# Patient Record
Sex: MALE | Race: WHITE
[De-identification: names, ages, dates, MRNs, and addresses within clinical notes are randomized per-mention and may not be internally consistent; named-entity substitution may affect disease eponyms.]

---

## 2018-06-14 ENCOUNTER — HOSPITAL ENCOUNTER (EMERGENCY)
Dept: HOSPITAL 80 - FED | Age: 23
Discharge: HOME | End: 2018-06-14
Payer: SELF-PAY

## 2018-06-14 VITALS — DIASTOLIC BLOOD PRESSURE: 63 MMHG | SYSTOLIC BLOOD PRESSURE: 122 MMHG
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
## 2018-06-14 DIAGNOSIS — S61.213A: Primary | ICD-10-CM
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
## 2018-06-14 DIAGNOSIS — F17.200: ICD-10-CM
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
## 2018-06-14 PROCEDURE — 0HQGXZZ REPAIR LEFT HAND SKIN, EXTERNAL APPROACH: ICD-10-PCS

## 2018-06-14 NOTE — EDPHY
General


Time Seen by Provider: 06/14/18 09:09


Narrative: 





CHIEF COMPLAINT: 


Laceration to finger





HISTORY OF PRESENT ILLNESS: 


Patient presents with complaints of laceration to left middle finger.  This 

happened approximately midnight, 8 hr prior to presentation.  He says he was 

cutting food with a knife when he accidentally "stabbed myself in the finger." 

He reports this happened at work.  He said that he did not think it was 

important the times we put a dressing on it went to bed.  When he woke this 

morning, he felt that it was bleeding with should been thus he presents.  

Minimal pain.  No numbness, tingling or weakness.  This is on the radial side 

of the finger.  No difficulty bending or straightening the finger.  No other 

associated complaints or modifying factors





TIME OF INJURY:


Midnight, 8 hr prior to presentation





TETANUS STATUS:


Less than 2 years ago





MEDICAL/SURGICAL/SOCIAL HISTORY:


Uncomplicated lives and works here independently.





REVIEW OF SYSTEMS:


Ten systems reviewed and are negative unless otherwise noted in the HPI





EXAMINATION


General Appearance:  Alert, no distress


Head: normocephalic, atraumatic


Cardiovascular:  Symmetric radial pulses 2+.  Brisk cap refill the fingers left 

hand.


Neurological:  A&O, sensory symmetric, interossei strength symmetric


Skin:  Warm and dry, no rash.  1 cm laceration on the left middle finger, 

medial to the nail without involving the nail.  No pulsatile bleeding.  No 

foreign body.


Extremities:  Minimal tenderness of the left middle finger with a laceration 

is.  There is full flexion extension of the finger including superficialis and 

profundus.








MDM:


9:05 a.m.


Laceration to the left middle finger not involving the fingernail.  Slight 

delayed presentation but less than 10 hr.  I have administered a digital block.

  We will proceed with irrigation.  Treat with prophylactic Keflex due to 

delayed presentation.  He is neuro intact with no signs of injury to the 

underlying tendon.








9:35 a.m.


Laceration has been loosely approximated with single suture.  I do not feel he 

will need delayed closure.  He has been started on Keflex and he will be 

discharged home with the same therapy for 7 days.  Return here for suture 

removal in 7 days.  Wound care discussed.








PROCEDURE: Laceration repair


Consent:  Verbal


Location:  Left middle finger


Length of repair:  1 cm


Complexity:  Simple


Layer involvement:  Single


Anesthesia:  Local digital block


Irrigation:  Extensive


Debridement:  None


Procedure description:  Following good anesthesia, the wound was copiously 

irrigated.  Wound bed was explored with a sterile glove, and there is no 

foreign body noted.  Wound borders were approximated well with good hemostasis.

  Tolerated well without complication.


Suture/Staple material:  5-0 Prolene, 1 simple interrupted suture


Wound care:  Routine as discussed


Suture/Staple removal:  7 Days








PROCEDURE: Digital Block


Indication: Finger laceration


Consent:  Verbal


Location:  Left middle finger


Anesthesia: Lidocaine 1% plain, 0.25% Marcaine plain, 5mL


Description:  Base of the finger was prepped.  The above was infused without 

difficulty.  Tolerated well.  Good anesthesia.


Complications: None








SUPERVISION:


This patient was independently evaluated without direct involvement of or 

examination by the attending physician. 





ED Precautions: 


Worsening pain. Erythema, edema, cyanosis, pallor, paresthesia or anesthesia.











- History


Smoking Status: Current every day smoker





- Objective


Vital Signs: 


 Initial Vital Signs











Temperature (C)  98.2 F   06/14/18 07:58


 


Heart Rate  66   06/14/18 07:58


 


Respiratory Rate  16   06/14/18 07:58


 


Blood Pressure  132/68 H  06/14/18 07:58


 


O2 Sat (%)  97   06/14/18 07:58








 











O2 Delivery Mode               Room Air














Allergies/Adverse Reactions: 


 





No Known Allergies Allergy (Unverified 06/14/18 07:57)


 








Home Medications: 














 Medication  Instructions  Recorded


 


Cephalexin [Keflex (*)] 500 mg PO QID #28 cap 06/14/18











Medications Given: 


 








Discontinued Medications





Cephalexin HCl (Keflex)  500 mg PO EDNOW ONE


   PRN Reason: Protocol


   Stop: 06/14/18 09:10


   Last Admin: 06/14/18 09:54 Dose:  500 mg








Departure





- Departure


Disposition: Home, Routine, Self-Care


Clinical Impression: 


Laceration of finger with delay in treatment


Qualifiers:


 Encounter type: initial encounter Qualified Code(s): S61.219A - Laceration 

without foreign body of unspecified finger without damage to nail, initial 

encounter





Condition: Good


Instructions:  Care For Your Stitches (ED), Laceration (ED)


Additional Instructions: 


1. Daily wound care as discussed


2. Light weight-bearing until sutures are removed


3. Return here in 7-10 days for suture removal


4. Keflex as prescribed to completion


Referrals: 


Physician,Emergency Dept, MD [Medical Doctor] - As per Instructions (7-10 days 

for suture removal)


Stand Alone Forms:  Work Comp Follow Up, Work Excuse


Prescriptions: 


Cephalexin [Keflex (*)] 500 mg PO QID #28 cap